# Patient Record
Sex: FEMALE | Race: WHITE | NOT HISPANIC OR LATINO | URBAN - NONMETROPOLITAN AREA
[De-identification: names, ages, dates, MRNs, and addresses within clinical notes are randomized per-mention and may not be internally consistent; named-entity substitution may affect disease eponyms.]

---

## 2019-04-11 ENCOUNTER — IMPORTED ENCOUNTER (OUTPATIENT)
Dept: URBAN - NONMETROPOLITAN AREA CLINIC 1 | Facility: CLINIC | Age: 59
End: 2019-04-11

## 2019-04-11 PROBLEM — H35.033: Noted: 2019-04-11

## 2019-04-11 PROBLEM — H25.813: Noted: 2019-12-13

## 2019-04-11 PROBLEM — H25.813: Noted: 2019-04-11

## 2019-04-11 PROBLEM — H35.033: Noted: 2019-12-13

## 2019-04-11 PROBLEM — H04.123: Noted: 2019-04-11

## 2019-04-11 PROBLEM — H52.223: Noted: 2019-04-11

## 2019-04-11 PROBLEM — E11.3293: Noted: 2019-04-11

## 2019-04-11 PROBLEM — H52.4: Noted: 2019-04-11

## 2019-04-11 PROBLEM — H52.13: Noted: 2019-04-11

## 2019-04-11 PROBLEM — E11.3293: Noted: 2019-12-13

## 2019-04-11 PROCEDURE — 92004 COMPRE OPH EXAM NEW PT 1/>: CPT

## 2019-04-11 PROCEDURE — 92015 DETERMINE REFRACTIVE STATE: CPT

## 2019-04-11 PROCEDURE — 92134 CPTRZ OPH DX IMG PST SGM RTA: CPT

## 2019-04-11 NOTE — PATIENT DISCUSSION
Type 2 DM since age 38. Last A1C - doesn't know having blood work done in 2 weeks. Fasting BS this AM was 101DM c DR-Order OCT MAC today BDR Hypertension Retinopathy Mac clear-Last A1C - doesn't know having blood work done in 2 weeks.  -Fasting BS this AM was 101-Stressed the importance of keeping blood sugars under control blood pressure under control and weight normalization and regular visits with PCP. -Explained the possible effects of poorly controlled diabetes and the damage that diabetes can cause to ocular health. -Patient to check HgbA1C.-Pt instructed to contact our office with any vision changes. Hypertensive Retinopathy OU-Discussed w/pt. -Pt instructed to monitor BP. Cataract OU-Reviewed symptoms of advancing cataract growth such as glare and halos and decreased vision.-Continue to monitor for now. Pt will notify us if any new symptoms develop. Myopia-Discussed diagnosis with patient. -Explained that people who are myopic are at a higher risk for developing RD/RT and reviewed associated S&S.-Pt to contact our office if symptoms develop. Astigmatism-Discussed diagnosis with patient. Presbyopia-Discussed diagnosis with patient. Updated spec Rx given. Recommend lens that will provide comfort as well as protect safety and health of eyes. Letter to [facility] - [facility] 3 Mo IOP check OCT MAC; 's Notes: [address] [address]

## 2019-10-10 ENCOUNTER — IMPORTED ENCOUNTER (OUTPATIENT)
Dept: URBAN - NONMETROPOLITAN AREA CLINIC 1 | Facility: CLINIC | Age: 59
End: 2019-10-10

## 2019-10-10 PROCEDURE — 92134 CPTRZ OPH DX IMG PST SGM RTA: CPT

## 2019-10-10 PROCEDURE — 99213 OFFICE O/P EST LOW 20 MIN: CPT

## 2019-10-10 NOTE — PATIENT DISCUSSION
Type 2 DM since age 38. Last A1C - doesn't know having blood work done in 2 weeks. Fasting BS this AM was 101DM c DR-Order OCT MAC today BDR Hypertension Retinopathy Mac clear-Last A1C - doesn't know having blood work done in 2 weeks.  -Fasting BS this AM was 101-Stressed the importance of keeping blood sugars under control blood pressure under control and weight normalization and regular visits with PCP. -Explained the possible effects of poorly controlled diabetes and the damage that diabetes can cause to ocular health. -Patient to check HgbA1C.-Pt instructed to contact our office with any vision changes. Hypertensive Retinopathy OU-Discussed w/pt. -Pt instructed to monitor BP. Cataract OU-Reviewed symptoms of advancing cataract growth such as glare and halos and decreased vision.-Continue to monitor for now. Pt will notify us if any new symptoms develop. Myopia-Discussed diagnosis with patient. -Explained that people who are myopic are at a higher risk for developing RD/RT and reviewed associated S&S.-Pt to contact our office if symptoms develop. Astigmatism-Discussed diagnosis with patient. Presbyopia-Discussed diagnosis with patient. Updated spec Rx given. Recommend lens that will provide comfort as well as protect safety and health of eyes. Letter to [facility] - [place] 3 Mo IOP check OCT MAC; 's Notes: [place] [id number]

## 2019-12-13 ENCOUNTER — IMPORTED ENCOUNTER (OUTPATIENT)
Dept: URBAN - NONMETROPOLITAN AREA CLINIC 1 | Facility: CLINIC | Age: 59
End: 2019-12-13

## 2019-12-13 PROCEDURE — 92014 COMPRE OPH EXAM EST PT 1/>: CPT

## 2019-12-13 NOTE — PATIENT DISCUSSION
Cataract(s)-Visually significant cataract OU.-Cataract(s) causing symptomatic impairment of visual function not correctable with a tolerable change in glasses or contact lenses lighting or non-operative means resulting in specific activity limitations and/or participation restrictions including but not limited to reading viewing television driving or meeting vocational or recreational needs. -Expectation is clearer vision and functional improvement in symptoms as well as reduced glare disability after cataract removal.-Order IOLMaster and OPD today. -Recommend Standard/Traditional based on today's OPD testing and lifestyle questionnaire.-All questions were answered regarding surgery including pre and post-op medications appointments activity restrictions and anesthetic usage.-The risks benefits and alternatives and special risk factors for the patient were discussed in detail including but not limited to: bleeding infection retinal detachment vitreous loss problems with the implant and possible need for additional surgery.-Although rare the possibility of complete vision loss was discussed.-The possible need for glasses post-operatively was discussed.-Order medical clearance exam.-Patient elects to proceed with cataract surgery OS . Will schedule at patient's convenience and re-evaluate OD  in the future. 3+ av nabila discussed the risks and discussed the risks of smokingType 2 DM since age 38. Last A1C - doesn't know having blood work done in 2 weeks. Fasting BS this AM was 101DM c DR-Stressed the importance of keeping blood sugars under control blood pressure under control and weight normalization and regular visits with PCP. -Explained the possible effects of poorly controlled diabetes and the damage that diabetes can cause to ocular health. -Patient to check HgbA1C.-Pt instructed to contact our office with any vision changes. Hypertensive Retinopathy OU-Discussed w/pt.  -Pt instructed to monitor BP. -Discussed the risks of smoking and the benefits of quitting.; Dr's Notes: [address] [place]

## 2020-01-30 PROBLEM — H04.123: Noted: 2020-01-30

## 2020-01-30 PROBLEM — H35.033: Noted: 2020-01-30

## 2020-01-30 PROBLEM — H25.813: Noted: 2020-01-30

## 2020-01-30 PROBLEM — E11.3293: Noted: 2020-01-30

## 2020-02-05 ENCOUNTER — IMPORTED ENCOUNTER (OUTPATIENT)
Dept: URBAN - NONMETROPOLITAN AREA CLINIC 1 | Facility: CLINIC | Age: 60
End: 2020-02-05

## 2020-02-05 NOTE — PATIENT DISCUSSION
Medical Clearance-Medical clearance done today. -No outstanding concerns that would preclude surgery.-Patient is cleared to proceed with scheduled surgery.-Patient directed to use tobradex QID until monday.  Then D/C and start before surgery drops as directed.; 's Notes: [facility] [address] [place]

## 2020-02-07 PROBLEM — E11.3293: Noted: 2020-02-07

## 2020-02-07 PROBLEM — H25.813: Noted: 2020-02-07

## 2020-02-07 PROBLEM — I51.9: Noted: 2020-02-07

## 2020-02-07 PROBLEM — J45.909: Noted: 2020-02-07

## 2020-02-07 PROBLEM — Z01.818: Noted: 2020-02-07

## 2020-02-07 PROBLEM — E78.5: Noted: 2020-02-07

## 2020-02-19 ENCOUNTER — IMPORTED ENCOUNTER (OUTPATIENT)
Dept: URBAN - NONMETROPOLITAN AREA CLINIC 1 | Facility: CLINIC | Age: 60
End: 2020-02-19

## 2020-05-07 PROBLEM — E11.3293: Noted: 2020-05-07

## 2020-05-07 PROBLEM — Z01.818: Noted: 2020-05-07

## 2020-05-07 PROBLEM — H25.813: Noted: 2020-05-07

## 2020-05-07 PROBLEM — E78.5: Noted: 2020-05-07

## 2020-05-08 ENCOUNTER — IMPORTED ENCOUNTER (OUTPATIENT)
Dept: URBAN - NONMETROPOLITAN AREA CLINIC 1 | Facility: CLINIC | Age: 60
End: 2020-05-08

## 2020-05-08 PROCEDURE — 92014 COMPRE OPH EXAM EST PT 1/>: CPT

## 2020-05-08 NOTE — PATIENT DISCUSSION
Medical Clearance-Medical clearance done today. -No outstanding concerns that would preclude surgery.-Patient is cleared to proceed with scheduled surgery.; Dr's Notes: [facility] [address] [place]

## 2020-05-08 NOTE — PATIENT DISCUSSION
Cataract(s)-Visually significant cataract OU .-Cataract(s) causing symptomatic impairment of visual function not correctable with a tolerable change in glasses or contact lenses lighting or non-operative means resulting in specific activity limitations and/or participation restrictions including but not limited to reading viewing television driving or meeting vocational or recreational needs. -Expectation is clearer vision and functional improvement in symptoms as well as reduced glare disability after cataract removal.-Order IOLMaster and OPD today. -Recommend Standard IOL/ Trad based on today's OPD testing and lifestyle questionnaire.-All questions were answered regarding surgery including pre and post-op medications appointments activity restrictions and anesthetic usage.-The risks benefits and alternatives and special risk factors for the patient were discussed in detail including but not limited to: bleeding infection retinal detachment vitreous loss problems with the implant and possible need for additional surgery.-Although rare the possibility of complete vision loss was discussed.-The possible need for glasses post-operatively was discussed.-Order medical clearance exam based on history of -Patient elects to proceed with cataract surgery OS .  Will schedule at patient's convenience and re-evaluate OD  in the future.; 's Notes: [facility] [address] [place]

## 2020-05-29 ENCOUNTER — IMPORTED ENCOUNTER (OUTPATIENT)
Dept: URBAN - NONMETROPOLITAN AREA CLINIC 1 | Facility: CLINIC | Age: 60
End: 2020-05-29

## 2020-05-29 PROCEDURE — 99024 POSTOP FOLLOW-UP VISIT: CPT

## 2020-06-03 ENCOUNTER — IMPORTED ENCOUNTER (OUTPATIENT)
Dept: URBAN - NONMETROPOLITAN AREA CLINIC 1 | Facility: CLINIC | Age: 60
End: 2020-06-03

## 2020-06-03 PROCEDURE — 99024 POSTOP FOLLOW-UP VISIT: CPT

## 2020-06-03 NOTE — PATIENT DISCUSSION
Medical Clearance-Medical clearance done today. -No outstanding concerns that would preclude surgery.-Patient is cleared to proceed with scheduled surgery.; Dr's Notes: [facility] [address] [place]

## 2020-06-03 NOTE — PATIENT DISCUSSION
Cataract(s)-Visually significant cataract OD. -Cataract(s) causing symptomatic impairment of visual function not correctable with a tolerable change in glasses or contact lenses lighting or non-operative means resulting in specific activity limitations and/or participation restrictions including but not limited to reading viewing television driving or meeting vocational or recreational needs. -Expectation is clearer vision and functional improvement in symptoms as well as reduced glare disability after cataract removal.-Recommend standard/traditional based on previous OPD testing and lifestyle questionnaire.-All questions were answered regarding surgery including pre and post-op medications appointments activity restrictions and anesthetic usage.-The risks benefits and alternatives and special risk factors for the patient were discussed in detail including but not limited to: bleeding infection retinal detachment vitreous loss problems with the implant and possible need for additional surgery.-Although rare the possibility of complete vision loss was discussed.-The need for glasses post-operatively was discussed.-Patient elects to proceed with cataract surgery OD . Will schedule at patient's convenience. s/p PCIOL OS-Pt doing well at 1 week s/p PCIOL. -Continue post-op gtts according to instruction sheet.-Okay to resume usual activites and d/c eye shield.; 's Notes: [address] [place]

## 2020-06-05 PROBLEM — E11.3293: Noted: 2020-02-07

## 2020-06-05 PROBLEM — H25.811: Noted: 2020-06-05

## 2020-06-05 PROBLEM — Z98.42: Noted: 2020-06-05

## 2020-06-12 ENCOUNTER — IMPORTED ENCOUNTER (OUTPATIENT)
Dept: URBAN - NONMETROPOLITAN AREA CLINIC 1 | Facility: CLINIC | Age: 60
End: 2020-06-12

## 2020-06-12 PROBLEM — Z98.41: Noted: 2020-06-12

## 2020-06-12 PROBLEM — E11.3293: Noted: 2020-02-07

## 2020-06-12 PROCEDURE — 99024 POSTOP FOLLOW-UP VISIT: CPT

## 2020-06-12 NOTE — PATIENT DISCUSSION
s/p PCIOL-Pt doing well s/p PCIOL. -Continue post-op gtts according to instruction sheet and sleep with eye shield over eye for 7 nights.-Avoid bending at the waist lifting anything over 5lbs and dirty or reyna environments. -RTC .; 's Notes: [address] [address]

## 2020-06-19 ENCOUNTER — IMPORTED ENCOUNTER (OUTPATIENT)
Dept: URBAN - NONMETROPOLITAN AREA CLINIC 1 | Facility: CLINIC | Age: 60
End: 2020-06-19

## 2020-06-19 PROCEDURE — 99024 POSTOP FOLLOW-UP VISIT: CPT

## 2020-06-19 NOTE — PATIENT DISCUSSION
s/p PCIOL-Pt doing well at 1 week s/p PCIOL. -Continue post-op gtts according to instruction sheet.-Okay to resume usual activites and d/c eye shield.; 's Notes: [address] [place]

## 2020-09-18 ENCOUNTER — IMPORTED ENCOUNTER (OUTPATIENT)
Dept: URBAN - NONMETROPOLITAN AREA CLINIC 1 | Facility: CLINIC | Age: 60
End: 2020-09-18

## 2020-09-18 PROBLEM — E11.3293: Noted: 2020-09-18

## 2020-09-18 PROBLEM — Z96.1: Noted: 2020-09-18

## 2020-09-18 PROCEDURE — 92014 COMPRE OPH EXAM EST PT 1/>: CPT

## 2020-09-18 NOTE — PATIENT DISCUSSION
s/p PCIOL-Stable PCIOL OU.-Monitor for PCO. DM s DR-Stressed the importance of keeping blood sugars under control blood pressure under control and weight normalization and regular visits with PCP. -Explained the possible effects of poorly controlled diabetes and the damage that diabetes can cause to ocular health. -Patient to check HgbA1C.-Pt instructed to contact our office with any vision changes.; Dr's Notes: [address] [place]

## 2021-09-20 ENCOUNTER — IMPORTED ENCOUNTER (OUTPATIENT)
Dept: URBAN - NONMETROPOLITAN AREA CLINIC 1 | Facility: CLINIC | Age: 61
End: 2021-09-20

## 2021-09-20 PROBLEM — Z96.1: Noted: 2020-09-18

## 2021-09-20 PROBLEM — E11.9: Noted: 2021-09-20

## 2021-09-20 PROCEDURE — 92014 COMPRE OPH EXAM EST PT 1/>: CPT

## 2022-04-09 ASSESSMENT — TONOMETRY
OS_IOP_MMHG: 18
OS_IOP_MMHG: 14
OS_IOP_MMHG: 24
OD_IOP_MMHG: 19
OD_IOP_MMHG: 18
OS_IOP_MMHG: 14
OD_IOP_MMHG: 14
OS_IOP_MMHG: 18
OD_IOP_MMHG: 18
OD_IOP_MMHG: 14
OD_IOP_MMHG: 20
OD_IOP_MMHG: 15
OS_IOP_MMHG: 20
OD_IOP_MMHG: 13
OS_IOP_MMHG: 14
OD_IOP_MMHG: 20
OS_IOP_MMHG: 20
OS_IOP_MMHG: 19

## 2022-04-09 ASSESSMENT — VISUAL ACUITY
OD_SC: 20/30
OD_SC: 20/60
OD_CC: 20/25
OD_GLARE: 20/40
OD_PH: 20/40
OD_CC: J1
OS_PH: 20/25+
OD_CC: 20/25-1
OS_CC: 20/40+1
OD_PAM: 20/40
OS_AM: 20/30
OS_CC: J1
OS_CC: 20/50
OD_PAM: 20/40
OS_CC: 20/40
OS_CC: 20/40
OS_SC: 20/50
OD_CC: 20/25-2
OS_GLARE: 20/50
OS_CC: 20/25
OS_GLARE: 20/200
OS_CC: 20/30-2
OS_SC: 20/40
OS_CC: 20/50
OD_CC: 20/50
OD_PAM: 20/25+
OD_SC: 20/30+
OD_CC: 20/30
OS_AM: 20/25+
OD_CC: 20/60
OD_GLARE: 20/100
OS_SC: 20/40
OS_SC: 20/50+
OS_CC: 20/60
OS_SC: 20/30
OD_SC: 20/50
OD_GLARE: 20/100
OS_PH: 20/40

## 2022-09-26 ENCOUNTER — FOLLOW UP (OUTPATIENT)
Dept: RURAL CLINIC 1 | Facility: CLINIC | Age: 62
End: 2022-09-26

## 2022-09-26 DIAGNOSIS — E11.9: ICD-10-CM

## 2022-09-26 DIAGNOSIS — Z96.1: ICD-10-CM

## 2022-09-26 PROCEDURE — 92014 COMPRE OPH EXAM EST PT 1/>: CPT

## 2022-09-26 ASSESSMENT — VISUAL ACUITY
OD_PH: 20/25-2
OS_SC: 20/30
OD_SC: 20/30+2
OS_PH: 20/30

## 2022-09-26 ASSESSMENT — TONOMETRY
OS_IOP_MMHG: 14
OD_IOP_MMHG: 14

## 2023-09-27 ENCOUNTER — FOLLOW UP (OUTPATIENT)
Dept: RURAL CLINIC 1 | Facility: CLINIC | Age: 63
End: 2023-09-27

## 2023-09-27 DIAGNOSIS — E11.9: ICD-10-CM

## 2023-09-27 DIAGNOSIS — H26.493: ICD-10-CM

## 2023-09-27 DIAGNOSIS — Z96.1: ICD-10-CM

## 2023-09-27 PROCEDURE — 92014 COMPRE OPH EXAM EST PT 1/>: CPT

## 2023-09-27 ASSESSMENT — VISUAL ACUITY
OD_SC: 20/30
OS_SC: 20/25-1

## 2023-09-27 ASSESSMENT — TONOMETRY
OD_IOP_MMHG: 15
OS_IOP_MMHG: 15

## 2025-02-24 ENCOUNTER — CONSULTATION/EVALUATION (OUTPATIENT)
Age: 65
End: 2025-02-24

## 2025-02-24 DIAGNOSIS — Z96.1: ICD-10-CM

## 2025-02-24 DIAGNOSIS — E11.9: ICD-10-CM

## 2025-02-24 DIAGNOSIS — H26.493: ICD-10-CM

## 2025-02-24 PROCEDURE — 92014 COMPRE OPH EXAM EST PT 1/>: CPT

## 2025-03-31 ENCOUNTER — CONSULTATION/EVALUATION (OUTPATIENT)
Age: 65
End: 2025-03-31

## 2025-03-31 DIAGNOSIS — Z96.1: ICD-10-CM

## 2025-03-31 DIAGNOSIS — E11.9: ICD-10-CM

## 2025-03-31 DIAGNOSIS — H26.493: ICD-10-CM

## 2025-03-31 PROCEDURE — 99214 OFFICE O/P EST MOD 30 MIN: CPT

## 2025-03-31 PROCEDURE — 66821 AFTER CATARACT LASER SURGERY: CPT | Mod: 25,RT

## 2025-05-01 ENCOUNTER — CLINIC PROCEDURE ONLY (OUTPATIENT)
Age: 65
End: 2025-05-01

## 2025-05-01 DIAGNOSIS — H26.492: ICD-10-CM

## 2025-05-01 PROCEDURE — 66821 AFTER CATARACT LASER SURGERY: CPT | Mod: 79,LT

## 2025-05-16 ENCOUNTER — POST-OP (OUTPATIENT)
Age: 65
End: 2025-05-16

## 2025-05-16 DIAGNOSIS — Z98.890: ICD-10-CM

## 2025-05-16 PROCEDURE — 99024 POSTOP FOLLOW-UP VISIT: CPT
